# Patient Record
Sex: FEMALE | URBAN - METROPOLITAN AREA
[De-identification: names, ages, dates, MRNs, and addresses within clinical notes are randomized per-mention and may not be internally consistent; named-entity substitution may affect disease eponyms.]

---

## 2018-07-31 ENCOUNTER — OFFICE VISIT (OUTPATIENT)
Dept: URBAN - METROPOLITAN AREA CLINIC 23 | Facility: CLINIC | Age: 48
End: 2018-07-31
Payer: COMMERCIAL

## 2018-07-31 PROCEDURE — 92014 COMPRE OPH EXAM EST PT 1/>: CPT | Performed by: OPTOMETRIST

## 2018-07-31 RX ORDER — PREDNISOLONE ACETATE 10 MG/ML
1 % SUSPENSION/ DROPS OPHTHALMIC
Qty: 1 | Refills: 1 | Status: INACTIVE
Start: 2018-07-31 | End: 2019-04-05

## 2018-07-31 ASSESSMENT — KERATOMETRY: OS: 44.88

## 2018-07-31 ASSESSMENT — INTRAOCULAR PRESSURE: OS: 14

## 2018-07-31 NOTE — IMPRESSION/PLAN
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 Plan: Discussed condition with pt. Return to see Dr. Jaycee Driver.

## 2018-07-31 NOTE — IMPRESSION/PLAN
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 Plan: Discussed condition with pt. Use prednisolone acetate QID x 2 weeks.

## 2019-04-05 ENCOUNTER — OFFICE VISIT (OUTPATIENT)
Dept: URBAN - METROPOLITAN AREA CLINIC 23 | Facility: CLINIC | Age: 49
End: 2019-04-05
Payer: COMMERCIAL

## 2019-04-05 PROCEDURE — 92014 COMPRE OPH EXAM EST PT 1/>: CPT | Performed by: OPHTHALMOLOGY

## 2019-04-05 PROCEDURE — 92134 CPTRZ OPH DX IMG PST SGM RTA: CPT | Performed by: OPHTHALMOLOGY

## 2019-04-05 RX ORDER — DUREZOL 0.5 MG/ML
0.05 % EMULSION OPHTHALMIC
Qty: 5 | Refills: 5 | Status: INACTIVE
Start: 2019-04-05 | End: 2020-06-03

## 2019-04-05 ASSESSMENT — INTRAOCULAR PRESSURE
OS: 9
OD: 13

## 2019-04-05 NOTE — IMPRESSION/PLAN
Impression: Chronic iridocyclitis, left eye: H20.12. OS. Condition: stable. Symptoms: will continue to monitor. Pt to continue Durezol QID OS and Q1H with flare ups. Plan: Discussed diagnosis in detail with patient. Reassured patient of current condition and treatment. Will continue to observe condition and or symptoms. Continue using current medication(s). Patient will continue Durezol QID OS, and Q1H OS when the flares up. OCT shows Stable no active edema no scar tissue OS Erxed refills to pharmacy on file

## 2019-09-24 ENCOUNTER — OFFICE VISIT (OUTPATIENT)
Dept: URBAN - METROPOLITAN AREA CLINIC 23 | Facility: CLINIC | Age: 49
End: 2019-09-24
Payer: COMMERCIAL

## 2019-09-24 DIAGNOSIS — H17.12 CENTRAL CORNEAL OPACITY, LEFT EYE: ICD-10-CM

## 2019-09-24 DIAGNOSIS — H18.422 BAND KERATOPATHY, LEFT EYE: Primary | ICD-10-CM

## 2019-09-24 PROCEDURE — 92014 COMPRE OPH EXAM EST PT 1/>: CPT | Performed by: OPTOMETRIST

## 2019-09-24 ASSESSMENT — VISUAL ACUITY: OS: HM

## 2019-09-24 ASSESSMENT — KERATOMETRY: OS: 52.25

## 2019-09-24 NOTE — IMPRESSION/PLAN
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 Plan: Discussed diagnosis in detail with patient. No treatment is required at this time. Reassured patient of current condition and treatment. Will continue to observe condition and or symptoms.

## 2019-09-24 NOTE — IMPRESSION/PLAN
Impression: Aphakia, left eye: H27.02 OS. Plan: Discussed diagnosis in detail with patient. No treatment is required at this time. Reassured patient of current condition and treatment. Will continue to observe condition and or symptoms. Discussed with patient that cls will not improve va dramatically. Patient opted for CLS RX at appointment.

## 2019-09-24 NOTE — IMPRESSION/PLAN
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 Plan: Discussed diagnosis in detail with patient. No treatment is required at this time. Reassured patient of current condition and treatment. Will continue to observe condition and or symptoms.

## 2019-11-04 ENCOUNTER — OFFICE VISIT (OUTPATIENT)
Dept: URBAN - METROPOLITAN AREA CLINIC 23 | Facility: CLINIC | Age: 49
End: 2019-11-04
Payer: COMMERCIAL

## 2019-11-04 DIAGNOSIS — H27.02 APHAKIA, LEFT EYE: ICD-10-CM

## 2019-11-04 PROCEDURE — 99213 OFFICE O/P EST LOW 20 MIN: CPT | Performed by: OPHTHALMOLOGY

## 2019-11-04 ASSESSMENT — INTRAOCULAR PRESSURE
OD: 9
OS: 9

## 2019-11-04 NOTE — IMPRESSION/PLAN
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 Condition: quality of life issue. Symptoms: may improve with surgery. Plan: Discussed diagnosis in detail with patient. Discussed treatment options with patient. Discussed risks of progression. Surgical treatment is required. Surgical risks and benefits were discussed, explained and understood by patient. Patient elects to have surgery.  RL-2

## 2020-01-03 ENCOUNTER — OFFICE VISIT (OUTPATIENT)
Dept: URBAN - METROPOLITAN AREA CLINIC 32 | Facility: CLINIC | Age: 50
End: 2020-01-03
Payer: MEDICAID

## 2020-01-03 PROCEDURE — 99213 OFFICE O/P EST LOW 20 MIN: CPT | Performed by: OPTOMETRIST

## 2020-01-03 NOTE — IMPRESSION/PLAN
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 Plan: Patient needs a prosthetic eye due to the shrinking socket and atrophy of lids and surrounding tissue. Recommend a scleral shell with the possibility of socket and lid expansion with progressively larger prosthetic eyes.  #8 shell 2020 see photo(from a distance appears more like a 1B with a lot of the scaring) 12.0 3.50 RTC 2 months pRDS

## 2020-01-09 ENCOUNTER — SURGERY (OUTPATIENT)
Dept: URBAN - METROPOLITAN AREA SURGERY 11 | Facility: SURGERY | Age: 50
End: 2020-01-09
Payer: MEDICAID

## 2020-01-09 PROCEDURE — 65436 CURETTE/TREAT CORNEA: CPT | Performed by: OPHTHALMOLOGY

## 2020-01-15 ENCOUNTER — POST-OPERATIVE VISIT (OUTPATIENT)
Dept: URBAN - METROPOLITAN AREA CLINIC 23 | Facility: CLINIC | Age: 50
End: 2020-01-15

## 2020-01-15 DIAGNOSIS — Z09 ENCNTR FOR F/U EXAM AFT TRTMT FOR COND OTH THAN MALIG NEOPLM: Primary | ICD-10-CM

## 2020-01-15 PROCEDURE — 99024 POSTOP FOLLOW-UP VISIT: CPT | Performed by: OPHTHALMOLOGY

## 2020-03-17 ENCOUNTER — OFFICE VISIT (OUTPATIENT)
Dept: URBAN - METROPOLITAN AREA CLINIC 32 | Facility: CLINIC | Age: 50
End: 2020-03-17
Payer: COMMERCIAL

## 2020-03-17 PROCEDURE — V2627 SCLERAL COVER SHELL: HCPCS | Performed by: OPTOMETRIST

## 2020-03-17 PROCEDURE — 99213 OFFICE O/P EST LOW 20 MIN: CPT | Performed by: OPTOMETRIST

## 2020-03-17 ASSESSMENT — INTRAOCULAR PRESSURE: OD: 8

## 2020-03-17 NOTE — IMPRESSION/PLAN
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 Plan: Patient needs a prosthetic eye due to the shrinking socket and atrophy of lids and surrounding tissue. Recommend a scleral shell with the possibility of socket and lid expansion with progressively larger prosthetic eyes.  disp shell

## 2020-06-17 ENCOUNTER — OFFICE VISIT (OUTPATIENT)
Dept: URBAN - METROPOLITAN AREA CLINIC 32 | Facility: CLINIC | Age: 50
End: 2020-06-17
Payer: MEDICAID

## 2020-06-17 PROCEDURE — V2624 POLISHING ARTIFICAL EYE: HCPCS | Performed by: OPTOMETRIST

## 2020-06-17 PROCEDURE — 99213 OFFICE O/P EST LOW 20 MIN: CPT | Performed by: OPTOMETRIST

## 2020-06-17 RX ORDER — DUREZOL 0.5 MG/ML
0.05 % EMULSION OPHTHALMIC
Qty: 1 | Refills: 5 | Status: INACTIVE
Start: 2020-06-17 | End: 2020-10-28

## 2020-06-17 ASSESSMENT — INTRAOCULAR PRESSURE: OS: 8

## 2020-06-17 NOTE — IMPRESSION/PLAN
Impression: Atrophy of globe, right eye: H40.521. Plan: Irritation and Discharge due to build up on the prosthesis and mechanical irritation. Cleaned and polished prosthesis today. Use Artificial Tears PRN. [place] BID OU PRN. Return to Clinic in 3 months for PRFU and further evaluation and management.

## 2020-07-28 ENCOUNTER — OFFICE VISIT (OUTPATIENT)
Dept: URBAN - METROPOLITAN AREA CLINIC 32 | Facility: CLINIC | Age: 50
End: 2020-07-28
Payer: MEDICAID

## 2020-07-28 PROCEDURE — 99213 OFFICE O/P EST LOW 20 MIN: CPT | Performed by: OPTOMETRIST

## 2020-07-28 ASSESSMENT — INTRAOCULAR PRESSURE: OS: 19

## 2020-07-28 NOTE — IMPRESSION/PLAN
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  Plan: too much socket irritation with prosthesis, refer for plastic eval and possible surgical intervention

## 2020-07-30 ENCOUNTER — OFFICE VISIT (OUTPATIENT)
Dept: URBAN - METROPOLITAN AREA CLINIC 32 | Facility: CLINIC | Age: 50
End: 2020-07-30
Payer: MEDICAID

## 2020-07-30 PROCEDURE — 92012 INTRM OPH EXAM EST PATIENT: CPT | Performed by: OPHTHALMOLOGY

## 2020-07-30 ASSESSMENT — INTRAOCULAR PRESSURE: OS: 17

## 2020-07-30 NOTE — IMPRESSION/PLAN
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 OD. Condition: established, stable. Plan: Discussed diagnosis in detail with patient. Discussed treatment options with patient. There is an exposed back half of tube shunt, this appears to have pushed through the right conjunctiva. Schedule with Thee to evaluate for removal of exposed tube shunt. Patient may wear prosthesis however there may be occasional bleeding.

## 2020-08-24 ENCOUNTER — OFFICE VISIT (OUTPATIENT)
Dept: URBAN - METROPOLITAN AREA CLINIC 32 | Facility: CLINIC | Age: 50
End: 2020-08-24
Payer: COMMERCIAL

## 2020-08-24 DIAGNOSIS — H20.12 CHRONIC IRIDOCYCLITIS, LEFT EYE: ICD-10-CM

## 2020-08-24 DIAGNOSIS — Z98.83 FILTERING (VITREOUS) BLEB AFTER GLAUCOMA SURGERY STATUS: ICD-10-CM

## 2020-08-24 DIAGNOSIS — H40.2210: ICD-10-CM

## 2020-08-24 DIAGNOSIS — T85.328A: Primary | ICD-10-CM

## 2020-08-24 PROCEDURE — 92014 COMPRE OPH EXAM EST PT 1/>: CPT | Performed by: OPHTHALMOLOGY

## 2020-08-24 PROCEDURE — 92134 CPTRZ OPH DX IMG PST SGM RTA: CPT | Performed by: OPHTHALMOLOGY

## 2020-08-24 RX ORDER — PREDNISOLONE ACETATE 10 MG/ML
1 % SUSPENSION/ DROPS OPHTHALMIC
Qty: 10 | Refills: 5 | Status: INACTIVE
Start: 2020-08-24 | End: 2020-08-24

## 2020-08-24 RX ORDER — OFLOXACIN 3 MG/ML
0.3 % SOLUTION/ DROPS OPHTHALMIC
Qty: 5 | Refills: 3 | Status: INACTIVE
Start: 2020-08-24 | End: 2020-10-28

## 2020-08-24 ASSESSMENT — INTRAOCULAR PRESSURE: OS: 14

## 2020-08-24 NOTE — IMPRESSION/PLAN
Impression: Filtering (vitreous) bleb after glaucoma surgery status: Z98.83. Right. Condition: unstable. Plan: Advised patient of condition. Discussed diagnosis in detail with patient. Surgical treatment is required.  See notes above

## 2020-08-24 NOTE — IMPRESSION/PLAN
Impression: Chronic iridocyclitis, left eye: H20.12. OS. Condition: stable. Symptoms: will continue to monitor. Pt to continue Durezol QID OS and Q1H with flare ups. Plan: Discussed diagnosis in detail with patient. Reassured patient of current condition and treatment. Will continue to observe condition and or symptoms. Continue using current medication(s). Patient will continue Durezol QID OS, and Q1H OS when the flares up.  OCT shows Stable no active edema no scar tissue OS

## 2020-08-24 NOTE — IMPRESSION/PLAN
Impression: Displacement of other ocular prosthetic devices, implants and grafts, initial encounter: T85.328A. Right. Condition: unstable. s/p Aqueous Shunt Plan: Exam of the right eye shows exposed tube supra temporal.  Patient states her eye has been bleeding lately. Discussed diagnosis in detail with patient. Discussed with patient that the tube is rubbing causing the irritation and bleeding. Surgical treatment is recommended to remove the tube Revision/Removal of Aqueous Shunt RIGHT EYE. Surgical risks and benefits were discussed, explained and understood by patient. All questions answered. RL1. Patient elects to proceed with recommendation.   Erxed drops to pharmacy on file Ofloxacin and Durezol

## 2020-08-24 NOTE — IMPRESSION/PLAN
Impression: Chronic angle-closure glaucoma of right eye: H40.2210. Right.
s/p Aqueous Shunt OD Plan: Advised patient of condition. Discussed diagnosis in detail with patient. Surgical treatment is required.  See notes above

## 2020-10-28 ENCOUNTER — OFFICE VISIT (OUTPATIENT)
Dept: URBAN - METROPOLITAN AREA CLINIC 32 | Facility: CLINIC | Age: 50
End: 2020-10-28
Payer: MEDICAID

## 2020-10-28 PROCEDURE — V2624 POLISHING ARTIFICAL EYE: HCPCS | Performed by: OPTOMETRIST

## 2020-10-28 PROCEDURE — 99213 OFFICE O/P EST LOW 20 MIN: CPT | Performed by: OPTOMETRIST

## 2020-10-28 RX ORDER — DUREZOL 0.5 MG/ML
0.05 % EMULSION OPHTHALMIC
Qty: 5 | Refills: 5 | Status: INACTIVE
Start: 2020-10-28 | End: 2021-01-28

## 2020-10-28 RX ORDER — OFLOXACIN 3 MG/ML
0.3 % SOLUTION/ DROPS OPHTHALMIC
Qty: 5 | Refills: 3 | Status: INACTIVE
Start: 2020-10-28 | End: 2021-01-28

## 2020-10-28 ASSESSMENT — INTRAOCULAR PRESSURE: OS: 16

## 2020-10-28 NOTE — IMPRESSION/PLAN
Impression: Atrophy of globe, right eye: H46.521. Plan: Irritation and Discharge due to build up on the prosthesis and mechanical irritation. Cleaned and polished prosthesis today. Use Artificial Tears PRN. [place] BID OU PRN. Return to Clinic in 3 months for PRFU and further evaluation and management.

## 2021-01-28 ENCOUNTER — OFFICE VISIT (OUTPATIENT)
Dept: URBAN - METROPOLITAN AREA CLINIC 32 | Facility: CLINIC | Age: 51
End: 2021-01-28
Payer: COMMERCIAL

## 2021-01-28 PROCEDURE — 99213 OFFICE O/P EST LOW 20 MIN: CPT | Performed by: OPTOMETRIST

## 2021-01-28 PROCEDURE — V2624 POLISHING ARTIFICAL EYE: HCPCS | Performed by: OPTOMETRIST

## 2021-01-28 RX ORDER — OFLOXACIN 3 MG/ML
0.3 % SOLUTION/ DROPS OPHTHALMIC
Qty: 5 | Refills: 3 | Status: INACTIVE
Start: 2021-01-28 | End: 2021-04-19

## 2021-01-28 RX ORDER — DUREZOL 0.5 MG/ML
0.05 % EMULSION OPHTHALMIC
Qty: 5 | Refills: 5 | Status: INACTIVE
Start: 2021-01-28 | End: 2021-04-22

## 2021-01-28 ASSESSMENT — INTRAOCULAR PRESSURE: OS: 7

## 2021-01-28 NOTE — IMPRESSION/PLAN
Impression: Atrophy of globe, right eye: H48.521. Plan: Irritation and Discharge due to build up on the prosthesis and mechanical irritation. Cleaned and polished prosthesis today. Use Artificial Tears PRN. [place] BID OU PRN. Return to Clinic in 3 months for PRFU and further evaluation and management.

## 2021-02-02 ENCOUNTER — OFFICE VISIT (OUTPATIENT)
Dept: URBAN - METROPOLITAN AREA CLINIC 32 | Facility: CLINIC | Age: 51
End: 2021-02-02
Payer: MEDICAID

## 2021-02-02 PROCEDURE — V2628 FABRICATION & FITTING: HCPCS | Performed by: OPTOMETRIST

## 2021-02-02 PROCEDURE — 92012 INTRM OPH EXAM EST PATIENT: CPT | Performed by: OPTOMETRIST

## 2021-02-02 ASSESSMENT — INTRAOCULAR PRESSURE: OS: 6

## 2021-02-02 NOTE — IMPRESSION/PLAN
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 Plan: Patient needs a prosthetic eye due to the shrinking socket and atrophy of lids and surrounding tissue. Recommend a scleral shell with the possibility of socket and lid expansion with progressively larger prosthetic eyes.  disp conf RTC 1 week

## 2021-02-09 ENCOUNTER — OFFICE VISIT (OUTPATIENT)
Dept: URBAN - METROPOLITAN AREA CLINIC 32 | Facility: CLINIC | Age: 51
End: 2021-02-09
Payer: MEDICAID

## 2021-02-09 PROCEDURE — 92012 INTRM OPH EXAM EST PATIENT: CPT | Performed by: OPTOMETRIST

## 2021-02-09 ASSESSMENT — INTRAOCULAR PRESSURE: OS: 7

## 2021-02-09 NOTE — IMPRESSION/PLAN
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 Plan: Patient needs a prosthetic eye due to the shrinking socket and atrophy of lids and surrounding tissue. Recommend a scleral shell with the possibility of socket and lid expansion with progressively larger prosthetic eyes.  RTC 1 week

## 2021-02-16 ENCOUNTER — OFFICE VISIT (OUTPATIENT)
Dept: URBAN - METROPOLITAN AREA CLINIC 32 | Facility: CLINIC | Age: 51
End: 2021-02-16
Payer: COMMERCIAL

## 2021-02-16 PROCEDURE — 92012 INTRM OPH EXAM EST PATIENT: CPT | Performed by: OPTOMETRIST

## 2021-02-16 ASSESSMENT — INTRAOCULAR PRESSURE: OS: 7

## 2021-02-16 NOTE — IMPRESSION/PLAN
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 Plan: Patient needs a prosthetic eye due to the shrinking socket and atrophy of lids and surrounding tissue. Recommend a scleral shell with the possibility of socket and lid expansion with progressively larger prosthetic eyes.  needs new shell 5R1B(add cloudiness to cornea( slight haze) 11.25, move iris 2mm nasally, add 1m to nasal point of prosthesis

## 2021-03-23 ENCOUNTER — OFFICE VISIT (OUTPATIENT)
Dept: URBAN - METROPOLITAN AREA CLINIC 32 | Facility: CLINIC | Age: 51
End: 2021-03-23
Payer: MEDICAID

## 2021-03-23 DIAGNOSIS — H44.521 ATROPHY OF GLOBE, RIGHT EYE: Primary | ICD-10-CM

## 2021-03-23 PROCEDURE — 92012 INTRM OPH EXAM EST PATIENT: CPT | Performed by: OPTOMETRIST

## 2021-03-23 PROCEDURE — V2627 SCLERAL COVER SHELL: HCPCS | Performed by: OPTOMETRIST

## 2021-03-23 ASSESSMENT — INTRAOCULAR PRESSURE: OS: 7

## 2021-04-06 ENCOUNTER — OFFICE VISIT (OUTPATIENT)
Dept: URBAN - METROPOLITAN AREA CLINIC 32 | Facility: CLINIC | Age: 51
End: 2021-04-06
Payer: MEDICAID

## 2021-04-06 PROCEDURE — V2624 POLISHING ARTIFICAL EYE: HCPCS | Performed by: OPTOMETRIST

## 2021-04-06 PROCEDURE — 92012 INTRM OPH EXAM EST PATIENT: CPT | Performed by: OPTOMETRIST

## 2021-04-06 ASSESSMENT — INTRAOCULAR PRESSURE: OS: 20

## 2021-04-06 NOTE — IMPRESSION/PLAN
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 Plan: Patient needs a prosthetic eye due to the shrinking socket and atrophy of lids and surrounding tissue. Recommend a scleral shell with the possibility of socket and lid expansion with progressively larger prosthetic eyes.  move iris nasally 1.5mm shrink iris by 0.25

## 2021-04-22 ENCOUNTER — OFFICE VISIT (OUTPATIENT)
Dept: URBAN - METROPOLITAN AREA CLINIC 11 | Facility: CLINIC | Age: 51
End: 2021-04-22
Payer: COMMERCIAL

## 2021-04-22 PROCEDURE — 99213 OFFICE O/P EST LOW 20 MIN: CPT | Performed by: OPHTHALMOLOGY

## 2021-04-22 NOTE — IMPRESSION/PLAN
Impression: Displacement of other ocular prosthetic devices, implants and grafts, initial encounter: T85.328A Right. Condition: established, stable. Symptoms: will continue to monitor. Plan: Discussed diagnosis in detail with patient. Discussed treatment options with patient. As I was removing her prosthetic shell, the old shunt fell out and is no longer attached to the right eye. Cleaned shell and reinserted to right side. Use ocuflox TID x 1 week.

## 2021-05-27 ENCOUNTER — OFFICE VISIT (OUTPATIENT)
Dept: URBAN - METROPOLITAN AREA CLINIC 32 | Facility: CLINIC | Age: 51
End: 2021-05-27
Payer: COMMERCIAL

## 2021-05-27 PROCEDURE — 99213 OFFICE O/P EST LOW 20 MIN: CPT | Performed by: OPTOMETRIST

## 2021-05-27 PROCEDURE — V2628 FABRICATION & FITTING: HCPCS | Performed by: OPTOMETRIST

## 2021-05-27 RX ORDER — DUREZOL 0.5 MG/ML
0.05 % EMULSION OPHTHALMIC
Qty: 5 | Refills: 5 | Status: INACTIVE
Start: 2021-05-27 | End: 2021-08-05

## 2021-05-27 RX ORDER — DUREZOL 0.5 MG/ML
0.05 % EMULSION OPHTHALMIC
Qty: 5 | Refills: 5 | Status: INACTIVE
Start: 2021-05-27 | End: 2021-05-27

## 2021-05-27 RX ORDER — OFLOXACIN 3 MG/ML
0.3 % SOLUTION/ DROPS OPHTHALMIC
Qty: 5 | Refills: 3 | Status: INACTIVE
Start: 2021-05-27 | End: 2021-08-05

## 2021-05-27 ASSESSMENT — INTRAOCULAR PRESSURE: OS: 7

## 2021-05-27 NOTE — IMPRESSION/PLAN
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 Plan: Patient needs a prosthetic eye due to the shrinking socket and atrophy of lids and surrounding tissue. Recommend a scleral shell with the possibility of socket and lid expansion with progressively larger prosthetic eyes.  move iris to new location iris needs to be 11.25 2020 match iris color do not relieve mold

## 2021-07-08 ENCOUNTER — OFFICE VISIT (OUTPATIENT)
Dept: URBAN - METROPOLITAN AREA CLINIC 32 | Facility: CLINIC | Age: 51
End: 2021-07-08
Payer: MEDICAID

## 2021-07-08 PROCEDURE — 92012 INTRM OPH EXAM EST PATIENT: CPT | Performed by: OPTOMETRIST

## 2021-07-08 PROCEDURE — V2627 SCLERAL COVER SHELL: HCPCS | Performed by: OPTOMETRIST

## 2021-07-08 ASSESSMENT — INTRAOCULAR PRESSURE: OS: 6

## 2021-07-08 NOTE — IMPRESSION/PLAN
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 Plan: Patient needs a prosthetic eye due to the shrinking socket and atrophy of lids and surrounding tissue. Recommend a scleral shell with the possibility of socket and lid expansion with progressively larger prosthetic eyes.  disp shell today

## 2021-07-16 ENCOUNTER — OFFICE VISIT (OUTPATIENT)
Dept: URBAN - METROPOLITAN AREA CLINIC 32 | Facility: CLINIC | Age: 51
End: 2021-07-16
Payer: COMMERCIAL

## 2021-07-16 PROCEDURE — 92012 INTRM OPH EXAM EST PATIENT: CPT | Performed by: OPTOMETRIST

## 2021-07-16 PROCEDURE — V2626 REDUCTION OF EYE PROSTHESIS: HCPCS | Performed by: OPTOMETRIST

## 2021-07-16 ASSESSMENT — INTRAOCULAR PRESSURE: OS: 7

## 2021-07-16 NOTE — IMPRESSION/PLAN
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 Plan: Patient needs a prosthetic eye due to the shrinking socket and atrophy of lids and surrounding tissue. Recommend a scleral shell with the possibility of socket and lid expansion with progressively larger prosthetic eyes.  reduced shell

## 2021-08-05 ENCOUNTER — OFFICE VISIT (OUTPATIENT)
Dept: URBAN - METROPOLITAN AREA CLINIC 32 | Facility: CLINIC | Age: 51
End: 2021-08-05
Payer: MEDICAID

## 2021-08-05 PROCEDURE — 99213 OFFICE O/P EST LOW 20 MIN: CPT | Performed by: OPTOMETRIST

## 2021-08-05 PROCEDURE — V2624 POLISHING ARTIFICAL EYE: HCPCS | Performed by: OPTOMETRIST

## 2021-08-05 RX ORDER — DUREZOL 0.5 MG/ML
0.05 % EMULSION OPHTHALMIC
Qty: 5 | Refills: 5 | Status: ACTIVE
Start: 2021-08-05

## 2021-08-05 RX ORDER — OFLOXACIN 3 MG/ML
0.3 % SOLUTION/ DROPS OPHTHALMIC
Qty: 5 | Refills: 3 | Status: ACTIVE
Start: 2021-08-05

## 2021-08-05 RX ORDER — OLOPATADINE HYDROCHLORIDE 7 MG/ML
0.7 % SOLUTION OPHTHALMIC
Qty: 15 | Refills: 0 | Status: ACTIVE
Start: 2021-08-05

## 2021-08-05 ASSESSMENT — INTRAOCULAR PRESSURE: OS: 21

## 2021-08-05 NOTE — IMPRESSION/PLAN
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 Plan: Patient needs a prosthetic eye due to the shrinking socket and atrophy of lids and surrounding tissue. Recommend a scleral shell with the possibility of socket and lid expansion with progressively larger prosthetic eyes.  Patient to continue Durezol QID and Ofloxacin QID

## 2021-12-10 ENCOUNTER — OFFICE VISIT (OUTPATIENT)
Dept: URBAN - METROPOLITAN AREA CLINIC 32 | Facility: CLINIC | Age: 51
End: 2021-12-10
Payer: COMMERCIAL

## 2021-12-10 PROCEDURE — V2624 POLISHING ARTIFICAL EYE: HCPCS | Performed by: OPTOMETRIST

## 2021-12-10 PROCEDURE — 99213 OFFICE O/P EST LOW 20 MIN: CPT | Performed by: OPTOMETRIST

## 2021-12-10 ASSESSMENT — INTRAOCULAR PRESSURE: OS: 11

## 2022-04-15 ENCOUNTER — OFFICE VISIT (OUTPATIENT)
Dept: URBAN - METROPOLITAN AREA CLINIC 32 | Facility: CLINIC | Age: 52
End: 2022-04-15
Payer: COMMERCIAL

## 2022-04-15 DIAGNOSIS — H44.521 ATROPHY OF GLOBE, RIGHT EYE: Primary | ICD-10-CM

## 2022-04-15 PROCEDURE — 99212 OFFICE O/P EST SF 10 MIN: CPT | Performed by: OPTOMETRIST

## 2022-04-15 PROCEDURE — V2624 POLISHING ARTIFICAL EYE: HCPCS | Performed by: OPTOMETRIST

## 2022-07-22 ENCOUNTER — OFFICE VISIT (OUTPATIENT)
Dept: URBAN - METROPOLITAN AREA CLINIC 32 | Facility: CLINIC | Age: 52
End: 2022-07-22
Payer: COMMERCIAL

## 2022-07-22 DIAGNOSIS — H18.512 ENDOTHELIAL CORNEAL DYSTROPHY, LEFT EYE: ICD-10-CM

## 2022-07-22 DIAGNOSIS — H44.521 ATROPHY OF GLOBE, RIGHT EYE: Primary | ICD-10-CM

## 2022-07-22 PROCEDURE — 99213 OFFICE O/P EST LOW 20 MIN: CPT | Performed by: OPTOMETRIST

## 2022-07-22 PROCEDURE — V2624 POLISHING ARTIFICAL EYE: HCPCS | Performed by: OPTOMETRIST

## 2022-07-22 NOTE — IMPRESSION/PLAN
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 Plan: Discussed diagnosis in detail with patient. Discussed treatment options with patient.  Recommend consult with Dr. Kelly Cruz

## 2022-08-02 ENCOUNTER — OFFICE VISIT (OUTPATIENT)
Dept: URBAN - METROPOLITAN AREA CLINIC 32 | Facility: CLINIC | Age: 52
End: 2022-08-02
Payer: COMMERCIAL

## 2022-08-02 DIAGNOSIS — H17.89 OTHER CORNEAL SCARS AND OPACITIES: Primary | ICD-10-CM

## 2022-08-02 PROCEDURE — 99213 OFFICE O/P EST LOW 20 MIN: CPT | Performed by: OPHTHALMOLOGY

## 2022-08-02 PROCEDURE — 76514 ECHO EXAM OF EYE THICKNESS: CPT | Performed by: OPHTHALMOLOGY

## 2022-08-02 RX ORDER — DUREZOL 0.5 MG/ML
0.05 % EMULSION OPHTHALMIC
Qty: 5 | Refills: 5 | Status: ACTIVE
Start: 2022-08-02

## 2022-08-02 RX ORDER — OFLOXACIN 3 MG/ML
0.3 % SOLUTION/ DROPS OPHTHALMIC
Qty: 5 | Refills: 3 | Status: ACTIVE
Start: 2022-08-02

## 2022-08-02 ASSESSMENT — INTRAOCULAR PRESSURE: OS: 15

## 2022-08-02 NOTE — IMPRESSION/PLAN
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 Condition: quality of life issue. Symptoms: could improve with surgery. Vision: vision affected. Plan: Discussed diagnosis in detail with patient. Discussed risks of progression. Discussed treatment options with patient. Discussed observation vs PTK OS. PKP not recommended at this time. Recommend PTK OS. R/B/A discussed and understood by patient and patient will consider surgery. Patient understands that she will need updated contact lens prescription post operatively.

## 2023-07-25 ENCOUNTER — OFFICE VISIT (OUTPATIENT)
Dept: URBAN - METROPOLITAN AREA CLINIC 32 | Facility: CLINIC | Age: 53
End: 2023-07-25
Payer: COMMERCIAL

## 2023-07-25 DIAGNOSIS — H44.521 ATROPHY OF GLOBE, RIGHT EYE: Primary | ICD-10-CM

## 2023-07-25 PROCEDURE — 99213 OFFICE O/P EST LOW 20 MIN: CPT | Performed by: OPTOMETRIST

## 2023-07-25 PROCEDURE — V2624 POLISHING ARTIFICAL EYE: HCPCS | Performed by: OPTOMETRIST

## 2023-07-25 RX ORDER — DUREZOL 0.5 MG/ML
0.05 % EMULSION OPHTHALMIC
Qty: 5 | Refills: 5 | Status: ACTIVE
Start: 2023-07-25

## 2023-07-25 ASSESSMENT — INTRAOCULAR PRESSURE: OS: 16

## 2023-07-26 ENCOUNTER — OFFICE VISIT (OUTPATIENT)
Dept: URBAN - METROPOLITAN AREA CLINIC 32 | Facility: CLINIC | Age: 53
End: 2023-07-26
Payer: MEDICAID

## 2023-07-26 DIAGNOSIS — H18.422 BAND KERATOPATHY, LEFT EYE: Primary | ICD-10-CM

## 2023-07-26 PROCEDURE — 99213 OFFICE O/P EST LOW 20 MIN: CPT | Performed by: OPHTHALMOLOGY

## 2023-07-26 ASSESSMENT — INTRAOCULAR PRESSURE: OS: 15

## 2023-08-08 ENCOUNTER — OFFICE VISIT (OUTPATIENT)
Dept: URBAN - METROPOLITAN AREA CLINIC 23 | Facility: CLINIC | Age: 53
End: 2023-08-08
Payer: COMMERCIAL

## 2023-08-08 DIAGNOSIS — H20.12 CHRONIC IRIDOCYCLITIS, LEFT EYE: Primary | ICD-10-CM

## 2023-08-08 DIAGNOSIS — Z48.810 ENCOUNTER FOR SURGICAL AFTERCARE FOLLOWING SURGERY ON A SENSE ORGAN: Primary | ICD-10-CM

## 2023-08-08 PROCEDURE — 92134 CPTRZ OPH DX IMG PST SGM RTA: CPT | Performed by: OPHTHALMOLOGY

## 2023-08-08 PROCEDURE — 99213 OFFICE O/P EST LOW 20 MIN: CPT | Performed by: OPHTHALMOLOGY

## 2023-08-08 PROCEDURE — 99024 POSTOP FOLLOW-UP VISIT: CPT | Performed by: OPTOMETRIST

## 2023-08-21 ENCOUNTER — OFFICE VISIT (OUTPATIENT)
Dept: URBAN - METROPOLITAN AREA CLINIC 28 | Facility: CLINIC | Age: 53
End: 2023-08-21
Payer: COMMERCIAL

## 2023-08-21 DIAGNOSIS — H20.12 CHRONIC IRIDOCYCLITIS, LEFT EYE: Primary | ICD-10-CM

## 2023-08-21 PROCEDURE — 92134 CPTRZ OPH DX IMG PST SGM RTA: CPT | Performed by: OPHTHALMOLOGY

## 2023-08-21 PROCEDURE — 99213 OFFICE O/P EST LOW 20 MIN: CPT | Performed by: OPHTHALMOLOGY

## 2023-08-21 ASSESSMENT — INTRAOCULAR PRESSURE: OS: 23

## 2023-08-29 ENCOUNTER — OFFICE VISIT (OUTPATIENT)
Dept: URBAN - METROPOLITAN AREA CLINIC 32 | Facility: CLINIC | Age: 53
End: 2023-08-29
Payer: COMMERCIAL

## 2023-08-29 DIAGNOSIS — H44.521 ATROPHY OF GLOBE, RIGHT EYE: Primary | ICD-10-CM

## 2023-08-29 PROCEDURE — V2628 FABRICATION & FITTING: HCPCS | Performed by: OPTOMETRIST

## 2023-08-29 PROCEDURE — 99213 OFFICE O/P EST LOW 20 MIN: CPT | Performed by: OPTOMETRIST

## 2023-09-08 ENCOUNTER — OFFICE VISIT (OUTPATIENT)
Dept: URBAN - METROPOLITAN AREA CLINIC 23 | Facility: CLINIC | Age: 53
End: 2023-09-08
Payer: COMMERCIAL

## 2023-09-08 DIAGNOSIS — H20.12 CHRONIC IRIDOCYCLITIS, LEFT EYE: Primary | ICD-10-CM

## 2023-09-08 PROCEDURE — 92134 CPTRZ OPH DX IMG PST SGM RTA: CPT | Performed by: OPHTHALMOLOGY

## 2023-09-08 PROCEDURE — 67028 INJECTION EYE DRUG: CPT | Performed by: OPHTHALMOLOGY

## 2023-09-08 PROCEDURE — 99213 OFFICE O/P EST LOW 20 MIN: CPT | Performed by: OPHTHALMOLOGY

## 2023-09-08 ASSESSMENT — INTRAOCULAR PRESSURE
OS: 8
OD: 0

## 2024-03-15 ENCOUNTER — OFFICE VISIT (OUTPATIENT)
Dept: URBAN - METROPOLITAN AREA CLINIC 32 | Facility: CLINIC | Age: 54
End: 2024-03-15
Payer: MEDICAID

## 2024-03-15 DIAGNOSIS — H44.521 ATROPHY OF GLOBE, RIGHT EYE: Primary | ICD-10-CM

## 2024-03-15 PROCEDURE — 92012 INTRM OPH EXAM EST PATIENT: CPT | Performed by: OPTOMETRIST

## 2024-03-15 PROCEDURE — V2627 SCLERAL COVER SHELL: HCPCS | Performed by: OPTOMETRIST

## 2024-03-15 ASSESSMENT — INTRAOCULAR PRESSURE: OS: 12
